# Patient Record
Sex: FEMALE | Race: WHITE | Employment: FULL TIME | URBAN - METROPOLITAN AREA
[De-identification: names, ages, dates, MRNs, and addresses within clinical notes are randomized per-mention and may not be internally consistent; named-entity substitution may affect disease eponyms.]

---

## 2021-01-06 ENCOUNTER — OFFICE VISIT (OUTPATIENT)
Dept: OBGYN CLINIC | Age: 26
End: 2021-01-06
Payer: COMMERCIAL

## 2021-01-06 VITALS
SYSTOLIC BLOOD PRESSURE: 130 MMHG | BODY MASS INDEX: 44.46 KG/M2 | WEIGHT: 251 LBS | HEART RATE: 97 BPM | DIASTOLIC BLOOD PRESSURE: 72 MMHG

## 2021-01-06 DIAGNOSIS — Z67.11 TYPE A BLOOD, RH NEGATIVE: ICD-10-CM

## 2021-01-06 DIAGNOSIS — O03.9 MISCARRIAGE: Primary | ICD-10-CM

## 2021-01-06 DIAGNOSIS — O03.9 MISCARRIAGE: ICD-10-CM

## 2021-01-06 LAB — GONADOTROPIN, CHORIONIC (HCG) QUANT: 6.1 MIU/ML

## 2021-01-06 PROCEDURE — 99204 OFFICE O/P NEW MOD 45 MIN: CPT | Performed by: ADVANCED PRACTICE MIDWIFE

## 2021-01-06 PROCEDURE — 96372 THER/PROPH/DIAG INJ SC/IM: CPT | Performed by: ADVANCED PRACTICE MIDWIFE

## 2021-01-06 ASSESSMENT — ENCOUNTER SYMPTOMS
COUGH: 0
NAUSEA: 0
CONSTIPATION: 0
ABDOMINAL PAIN: 0
SHORTNESS OF BREATH: 0
DIARRHEA: 0
VOMITING: 0

## 2021-01-06 NOTE — PROGRESS NOTES
Chief Complaint:     Kyle Siu is a 26 y.o. female who presents here today for complaints of:      Chief Complaint   Patient presents with    Follow-up      [facility] for SAB. HCG is 19. still bleeding steadily     History of Present Illness:     Miscarriage - here today with complaints of:  Presented to [facility] in [facility] with complaints of vaginal bleeding in early pregnancy.  Denies pain. Continues to have consistent moderate bleeding with passage of small clots/tissue.  Based on her HCG level and US results, she was diagnosed with miscarriage and discharged home.  Would like to begin trying to conceive again as soon as possible. Past Medical, Surgical, and Family History: Allergies:  Patient has no known allergies. Patient's last menstrual period was 2020 (exact date). Obstetrical History:     Contraceptive Method:  None, desires pregnancy    Past Medical History:   Diagnosis Date    Type A blood, Rh negative 2021     No past surgical history on file. Family History   Problem Relation Age of Onset    Breast Cancer Neg Hx      Medications:     Current Outpatient Medications on File Prior to Visit   Medication Sig Dispense Refill    Prenatal Vit-Fe Fumarate-FA (PRENATAL 1+1 PO) Take 1 tablet by mouth daily       No current facility-administered medications on file prior to visit. Review of Systems:     Review of Systems   Constitutional: Negative for chills, fatigue and fever. Respiratory: Negative for cough and shortness of breath. Gastrointestinal: Negative for abdominal pain, constipation, diarrhea, nausea and vomiting. Genitourinary: Negative for difficulty urinating, dysuria, menstrual problem, pelvic pain, vaginal bleeding and vaginal discharge. Neurological: Negative for dizziness and headaches. All other systems reviewed and are negative.     Physical Exam:     Vitals:  /72   Pulse 97   Wt 251 lb (113.9 kg)   LMP 2020 (Exact Date) Number of Occurrences:   1     Standing Expiration Date:   1/6/2022     No orders of the defined types were placed in this encounter.       VALENTINE Cho CNM

## 2021-01-07 NOTE — RESULT ENCOUNTER NOTE
HCG Trendin21 ( Lab) - 19  21 - 6.1    Repeat HCG in 1 week    I've sent a [employer] message, please make sure Patricia Ernandez has seen her lab results. She needs to have her HCG repeated in 1 week. The order is in. Thanks!

## 2021-01-12 DIAGNOSIS — O03.9 MISCARRIAGE: ICD-10-CM

## 2021-01-12 LAB — GONADOTROPIN, CHORIONIC (HCG) QUANT: 0.3 MIU/ML

## 2021-01-13 ENCOUNTER — TELEPHONE (OUTPATIENT)
Dept: OBGYN CLINIC | Age: 26
End: 2021-01-13

## 2021-07-22 ENCOUNTER — OFFICE VISIT (OUTPATIENT)
Dept: OBGYN CLINIC | Age: 26
End: 2021-07-22
Payer: COMMERCIAL

## 2021-07-22 VITALS
SYSTOLIC BLOOD PRESSURE: 122 MMHG | HEIGHT: 63 IN | BODY MASS INDEX: 47.66 KG/M2 | DIASTOLIC BLOOD PRESSURE: 74 MMHG | WEIGHT: 269 LBS | HEART RATE: 120 BPM

## 2021-07-22 DIAGNOSIS — Z32.01 POSITIVE URINE PREGNANCY TEST: ICD-10-CM

## 2021-07-22 DIAGNOSIS — Z76.89 ENCOUNTER TO ESTABLISH CARE WITH NEW DOCTOR: ICD-10-CM

## 2021-07-22 DIAGNOSIS — N91.2 AMENORRHEA: Primary | ICD-10-CM

## 2021-07-22 LAB
BILIRUBIN URINE: NEGATIVE
BLOOD, URINE: NEGATIVE
CLARITY: CLEAR
COLOR: YELLOW
GLUCOSE URINE: NEGATIVE MG/DL
HCG, URINE, POC: POSITIVE
KETONES, URINE: 40 MG/DL
LEUKOCYTE ESTERASE, URINE: NEGATIVE
Lab: ABNORMAL
NEGATIVE QC PASS/FAIL: ABNORMAL
NITRITE, URINE: NEGATIVE
PH UA: 5.5 (ref 5–9)
POSITIVE QC PASS/FAIL: ABNORMAL
PROTEIN UA: NEGATIVE MG/DL
SPECIFIC GRAVITY UA: 1.01 (ref 1–1.03)
UROBILINOGEN, URINE: 0.2 E.U./DL

## 2021-07-22 PROCEDURE — 1036F TOBACCO NON-USER: CPT | Performed by: ADVANCED PRACTICE MIDWIFE

## 2021-07-22 PROCEDURE — G8427 DOCREV CUR MEDS BY ELIG CLIN: HCPCS | Performed by: ADVANCED PRACTICE MIDWIFE

## 2021-07-22 PROCEDURE — G8419 CALC BMI OUT NRM PARAM NOF/U: HCPCS | Performed by: ADVANCED PRACTICE MIDWIFE

## 2021-07-22 PROCEDURE — 99214 OFFICE O/P EST MOD 30 MIN: CPT | Performed by: ADVANCED PRACTICE MIDWIFE

## 2021-07-22 PROCEDURE — 81025 URINE PREGNANCY TEST: CPT | Performed by: ADVANCED PRACTICE MIDWIFE

## 2021-07-22 NOTE — PROGRESS NOTES
Chief Complaint:     Kameron Ashley is a 26 y.o. female who presents here today for complaints of:      Chief Complaint   Patient presents with    Amenorrhea     LMP approximately 21       History of Present Illness:     Amenorrhea, Confirmation of Pregnancy      A home pregnancy test was taken, and the results was positive.  Patient's last menstrual period was 2021 (exact date). and was normal in character.  She reports the following associated symptoms:  fatigue, breast tenderness, nausea and positive home pregnancy test.     Pelvic pain/cramping:   Mild, occasional   Vaginal bleeding: no.     Taking PNV: yes. Nausea/Vomiting - She describes nausea/vomiting severity as moderate no emesis   Tolerating PO:  Somewhat     Alleviating/modifying factors tried:  Trigger avoidance, small meals. Gynecological History  · Prior Pap History:    · No prior pap records  · Sexual health:    · Exposure to a partner with a known sexually transmitted disease within the last 80 days - No  · Gender of sexual partners - Male  · Personal past history  of sexually transmitted diseases:  None    Past Medical, Surgical, and Family History: Allergies:  Patient has no known allergies. Patient's last menstrual period was 2021 (exact date). Obstetrical History:        Past Medical History:   Diagnosis Date    Type A blood, Rh negative 2021     History reviewed. No pertinent surgical history. Family History   Problem Relation Age of Onset    Breast Cancer Neg Hx      Medications:     Current Outpatient Medications on File Prior to Visit   Medication Sig Dispense Refill    Prenatal Vit-Fe Fumarate-FA (PRENATAL 1+1 PO) Take 1 tablet by mouth daily (Patient not taking: Reported on 2021)       No current facility-administered medications on file prior to visit. Review of Systems:     Review of Systems   Constitutional: Positive for fatigue.  Negative for activity change, urine pregnancy test  Type and screen    CBC Auto Differential    Glucose    HCG, Quantitative, Pregnancy    Hepatitis B Surface Antigen    Hepatitis C Antibody    HIV Screen    RPR Reflex to Titer and TPPA    Rubella antibody, IgG    TSH with Reflex    Urinalysis    Pain Management Drug Screen    Culture, Urine    US OB LESS THAN 14 WEEKS SINGLE OR FIRST GESTATION    US OB TRANSVAGINAL    PAP SMEAR    C.trachomatis N.gonorrhoeae DNA, Thin Prep    Wet prep, genital   3. Encounter to establish care with new doctor  Ambulatory referral to Family Practice     Plan:     1. Amenorrhea, Confirmation of Pregnancy  Dating US within the next 1-2 weeks  Obtain initial prenatal labs. Rx for prenatal vitamins    2. Nausea/Vomiting  Frequent nausea without emesis    3. Gynecological Exam  Pap - collected  Screening for STD's - collected with STD's    Follow Up:  Return in about 4 weeks (around 8/19/2021) for Initial OB Visit (Please schedule after dating US). Orders Placed This Encounter   Procedures    Culture, Urine     Standing Status:   Future     Number of Occurrences:   1     Standing Expiration Date:   7/22/2022     Order Specific Question:   Specify (ex-cath, midstream, cysto, etc)? Answer:   midstream    C.trachomatis N.gonorrhoeae DNA, Thin Prep     Standing Status:   Future     Number of Occurrences:   1     Standing Expiration Date:   7/22/2022    Wet prep, genital     Standing Status:   Future     Number of Occurrences:   1     Standing Expiration Date:   7/22/2022    US OB LESS THAN 14 WEEKS SINGLE OR FIRST GESTATION     Standing Status:   Future     Standing Expiration Date:   7/22/2022     Order Specific Question:   Reason for exam:     Answer:   Establish viability, confirm dating     Order Specific Question:   Reason for exam:     Answer:    Working [facility]:  3/8/22 based on an exact LMP (6/1/21)     Order Specific Question:   Reason for exam:     Answer:   Transvaginal PRN    US OB TRANSVAGINAL Standing Status:   Future     Standing Expiration Date:   7/22/2022     Order Specific Question:   Reason for exam:     Answer:   Transvaginal PRN    CBC Auto Differential     Standing Status:   Future     Number of Occurrences:   1     Standing Expiration Date:   7/22/2022    Glucose     Standing Status:   Future     Number of Occurrences:   1     Standing Expiration Date:   7/22/2022    HCG, Quantitative, Pregnancy     Standing Status:   Future     Number of Occurrences:   1     Standing Expiration Date:   7/22/2022    Hepatitis B Surface Antigen     Standing Status:   Future     Number of Occurrences:   1     Standing Expiration Date:   7/22/2022    Hepatitis C Antibody     Standing Status:   Future     Number of Occurrences:   1     Standing Expiration Date:   7/22/2022    HIV Screen     Standing Status:   Future     Number of Occurrences:   1     Standing Expiration Date:   7/22/2022    RPR Reflex to Titer and TPPA     Standing Status:   Future     Number of Occurrences:   1     Standing Expiration Date:   7/22/2022    Rubella antibody, IgG     Standing Status:   Future     Number of Occurrences:   1     Standing Expiration Date:   7/22/2022    TSH with Reflex     Standing Status:   Future     Number of Occurrences:   1     Standing Expiration Date:   7/22/2022    Urinalysis     Standing Status:   Future     Number of Occurrences:   1     Standing Expiration Date:   8/22/2021    Pain Management Drug Screen     Standing Status:   Future     Number of Occurrences:   1     Standing Expiration Date:   7/22/2022    PAP SMEAR     Standing Status:   Future     Number of Occurrences:   1     Standing Expiration Date:   7/22/2022     Order Specific Question:   Collection Type     Answer: Thin Prep     Order Specific Question:   Prior Abnormal Pap Test     Answer:   No     Order Specific Question:   Screening or Diagnostic     Answer:   Screening     Order Specific Question:   HPV Requested?      Answer: Yes     Order Specific Question:   High Risk Patient     Answer:   N/A    Ambulatory referral to Family Practice     Referral Priority:   Routine     Referral Type:   Consult for Advice and Opinion     Number of Visits Requested:   1    POC Pregnancy Ur-Qual    Type and screen     Standing Status:   Future     Number of Occurrences:   1     Standing Expiration Date:   7/22/2022     No orders of the defined types were placed in this encounter.

## 2021-07-23 DIAGNOSIS — Z32.01 POSITIVE URINE PREGNANCY TEST: ICD-10-CM

## 2021-07-23 LAB
ABO/RH: NORMAL
ANTIBODY SCREEN: NORMAL
BASOPHILS ABSOLUTE: 0 K/UL (ref 0–0.2)
BASOPHILS RELATIVE PERCENT: 0.4 %
CLUE CELLS: NORMAL
EOSINOPHILS ABSOLUTE: 0.1 K/UL (ref 0–0.7)
EOSINOPHILS RELATIVE PERCENT: 1.1 %
GLUCOSE BLD-MCNC: 93 MG/DL (ref 70–99)
GONADOTROPIN, CHORIONIC (HCG) QUANT: 4001 MIU/ML
HCT VFR BLD CALC: 39.2 % (ref 37–47)
HEMOGLOBIN: 13.3 G/DL (ref 12–16)
HEPATITIS B SURFACE ANTIGEN INTERPRETATION: NORMAL
HEPATITIS C ANTIBODY INTERPRETATION: NORMAL
LYMPHOCYTES ABSOLUTE: 2.3 K/UL (ref 1–4.8)
LYMPHOCYTES RELATIVE PERCENT: 24.3 %
MCH RBC QN AUTO: 29.7 PG (ref 27–31.3)
MCHC RBC AUTO-ENTMCNC: 34 % (ref 33–37)
MCV RBC AUTO: 87.1 FL (ref 82–100)
MONOCYTES ABSOLUTE: 0.5 K/UL (ref 0.2–0.8)
MONOCYTES RELATIVE PERCENT: 5.1 %
NEUTROPHILS ABSOLUTE: 6.6 K/UL (ref 1.4–6.5)
NEUTROPHILS RELATIVE PERCENT: 69.1 %
PDW BLD-RTO: 13.7 % (ref 11.5–14.5)
PLATELET # BLD: 281 K/UL (ref 130–400)
RBC # BLD: 4.49 M/UL (ref 4.2–5.4)
RUBELLA ANTIBODY IGG: 320.3 IU/ML
TRICHOMONAS PREP: NORMAL
TRICHOMONAS VAGINALIS SCREEN: NEGATIVE
TSH REFLEX: 2.48 UIU/ML (ref 0.44–3.86)
WBC # BLD: 9.6 K/UL (ref 4.8–10.8)
YEAST WET PREP: NORMAL

## 2021-07-24 PROBLEM — Z78.9 RUBELLA IMMUNE: Status: ACTIVE | Noted: 2021-07-24

## 2021-07-24 LAB
RPR: NORMAL
URINE CULTURE, ROUTINE: NORMAL

## 2021-07-25 LAB
6-ACETYLMORPHINE: NOT DETECTED
7-AMINOCLONAZEPAM: NOT DETECTED
ALPHA-OH-ALPRAZOLAM: NOT DETECTED
ALPHA-OH-MIDAZOLAM, URINE: NOT DETECTED
ALPRAZOLAM: NOT DETECTED
AMPHETAMINE: NOT DETECTED
BARBITURATES: NOT DETECTED
BENZOYLECGONINE: NOT DETECTED
BUPRENORPHINE: NOT DETECTED
CARISOPRODOL: NOT DETECTED
CLONAZEPAM: NOT DETECTED
CODEINE: NOT DETECTED
CREATININE URINE: 83.8 MG/DL (ref 20–400)
DIAZEPAM: NOT DETECTED
EER PAIN MGT DRUG PANEL, HIGH RES/EMIT U: NORMAL
ETHYL GLUCURONIDE: NOT DETECTED
FENTANYL: NOT DETECTED
GABAPENTIN: NOT DETECTED
HYDROCODONE: NOT DETECTED
HYDROMORPHONE: NOT DETECTED
LORAZEPAM: NOT DETECTED
MARIJUANA METABOLITE: NOT DETECTED
MDA: NOT DETECTED
MDEA: NOT DETECTED
MDMA URINE: NOT DETECTED
MEPERIDINE: NOT DETECTED
METHADONE: NOT DETECTED
METHAMPHETAMINE: NOT DETECTED
METHYLPHENIDATE: NOT DETECTED
MIDAZOLAM: NOT DETECTED
MORPHINE: NOT DETECTED
NALOXONE: NOT DETECTED
NORBUPRENORPHINE, FREE: NOT DETECTED
NORDIAZEPAM: NOT DETECTED
NORFENTANYL: NOT DETECTED
NORHYDROCODONE, URINE: NOT DETECTED
NOROXYCODONE: NOT DETECTED
NOROXYMORPHONE, URINE: NOT DETECTED
OXAZEPAM: NOT DETECTED
OXYCODONE: NOT DETECTED
OXYMORPHONE: NOT DETECTED
PAIN MANAGEMENT DRUG PANEL: NORMAL
PCP: NOT DETECTED
PHENTERMINE: NOT DETECTED
PREGABALIN: NOT DETECTED
TAPENTADOL, URINE: NOT DETECTED
TAPENTADOL-O-SULFATE, URINE: NOT DETECTED
TEMAZEPAM: NOT DETECTED
TRAMADOL: NOT DETECTED
ZOLPIDEM: NOT DETECTED

## 2021-07-27 LAB
C. TRACHOMATIS DNA,THIN PREP: NEGATIVE
HIV AG/AB: NONREACTIVE
HPV COMMENT: NORMAL
HPV TYPE 16: NOT DETECTED
HPV TYPE 18: NOT DETECTED
HPVOH (OTHER TYPES): NOT DETECTED
N. GONORRHOEAE DNA, THIN PREP: NEGATIVE

## 2021-07-28 ASSESSMENT — ENCOUNTER SYMPTOMS
TROUBLE SWALLOWING: 0
RHINORRHEA: 0
COUGH: 0
VOICE CHANGE: 0
SHORTNESS OF BREATH: 0
CONSTIPATION: 0
SORE THROAT: 0
DIARRHEA: 0
NAUSEA: 1
VOMITING: 0
ABDOMINAL PAIN: 0

## 2021-08-02 ENCOUNTER — HOSPITAL ENCOUNTER (OUTPATIENT)
Dept: ULTRASOUND IMAGING | Age: 26
Discharge: HOME | End: 2021-08-04
Payer: COMMERCIAL

## 2021-08-02 DIAGNOSIS — N91.2 AMENORRHEA: ICD-10-CM

## 2021-08-02 DIAGNOSIS — Z32.01 POSITIVE URINE PREGNANCY TEST: ICD-10-CM

## 2021-08-02 PROCEDURE — 76817 TRANSVAGINAL US OBSTETRIC: CPT

## 2021-08-02 PROCEDURE — 76801 OB US < 14 WKS SINGLE FETUS: CPT

## 2021-08-04 ENCOUNTER — TELEPHONE (OUTPATIENT)
Dept: OBGYN CLINIC | Age: 26
End: 2021-08-04

## 2021-08-04 DIAGNOSIS — Z34.90 EARLY STAGE OF PREGNANCY: Primary | ICD-10-CM

## 2021-08-05 DIAGNOSIS — Z34.90 EARLY STAGE OF PREGNANCY: ICD-10-CM

## 2021-08-05 LAB — GONADOTROPIN, CHORIONIC (HCG) QUANT: NORMAL MIU/ML

## 2021-08-06 DIAGNOSIS — Z34.90 EARLY STAGE OF PREGNANCY: Primary | ICD-10-CM

## 2021-08-18 ENCOUNTER — HOSPITAL ENCOUNTER (OUTPATIENT)
Dept: ULTRASOUND IMAGING | Age: 26
Discharge: HOME | End: 2021-08-20
Payer: COMMERCIAL

## 2021-08-18 DIAGNOSIS — Z34.90 EARLY STAGE OF PREGNANCY: ICD-10-CM

## 2021-08-18 PROCEDURE — 76817 TRANSVAGINAL US OBSTETRIC: CPT

## 2021-08-18 PROCEDURE — 76801 OB US < 14 WKS SINGLE FETUS: CPT

## 2021-08-19 ENCOUNTER — OFFICE VISIT (OUTPATIENT)
Dept: OBGYN CLINIC | Age: 26
End: 2021-08-19
Payer: COMMERCIAL

## 2021-08-19 VITALS
WEIGHT: 268 LBS | HEART RATE: 100 BPM | BODY MASS INDEX: 47.48 KG/M2 | DIASTOLIC BLOOD PRESSURE: 62 MMHG | HEIGHT: 63 IN | SYSTOLIC BLOOD PRESSURE: 118 MMHG

## 2021-08-19 DIAGNOSIS — Z67.11 TYPE A BLOOD, RH NEGATIVE: Primary | ICD-10-CM

## 2021-08-19 DIAGNOSIS — O20.9 BLEEDING IN EARLY PREGNANCY: ICD-10-CM

## 2021-08-19 DIAGNOSIS — O20.0 THREATENED MISCARRIAGE IN EARLY PREGNANCY: ICD-10-CM

## 2021-08-19 PROCEDURE — G8427 DOCREV CUR MEDS BY ELIG CLIN: HCPCS | Performed by: ADVANCED PRACTICE MIDWIFE

## 2021-08-19 PROCEDURE — G8419 CALC BMI OUT NRM PARAM NOF/U: HCPCS | Performed by: ADVANCED PRACTICE MIDWIFE

## 2021-08-19 PROCEDURE — 1036F TOBACCO NON-USER: CPT | Performed by: ADVANCED PRACTICE MIDWIFE

## 2021-08-19 PROCEDURE — 99214 OFFICE O/P EST MOD 30 MIN: CPT | Performed by: ADVANCED PRACTICE MIDWIFE

## 2021-08-19 PROCEDURE — 96372 THER/PROPH/DIAG INJ SC/IM: CPT | Performed by: ADVANCED PRACTICE MIDWIFE

## 2021-08-20 ENCOUNTER — TELEPHONE (OUTPATIENT)
Dept: OBGYN CLINIC | Age: 26
End: 2021-08-20

## 2021-08-20 DIAGNOSIS — N96 RECURRENT PREGNANCY LOSS: Primary | ICD-10-CM

## 2021-08-20 DIAGNOSIS — O03.9 SPONTANEOUS MISCARRIAGE: Primary | ICD-10-CM

## 2021-08-20 NOTE — TELEPHONE ENCOUNTER
SUBJECTIVE:  Called to discuss HCG results, she had already viewed her results through [address] Discussed weekly HCG until negative. Endometrium will be provided for her to begin using when conception occurs again. OBJECTIVE:  HCG - 7,097 (previously 17,048)  Blood Type:  A Negative. Rhogam given 8/19/21    ASSESSMENT:  Spontaneous miscarriage  Rh Negative - Rhogam given 8/19/21    PLAN:  Trend HCG weekly until negative  Rx for Endometrium - begin with positive pregnancy test.  Work Excuse Letter - days missed in January and this month (August 19, 20, 21, 22.)  She may return on 8/23/21 without restrictions. Email letter to [email or web address]    VALENTINE Turpin CNM

## 2021-08-21 ASSESSMENT — ENCOUNTER SYMPTOMS
SHORTNESS OF BREATH: 0
DIARRHEA: 0
NAUSEA: 0
VOMITING: 0
CONSTIPATION: 0
ABDOMINAL PAIN: 0

## 2021-08-21 NOTE — PROGRESS NOTES
SUBJECTIVE:  Deana Dobson is a 26 y.o. female who presents here today for complaints of:      Chief Complaint   Patient presents with    Follow-up     Hcg quant 8/5/21. US done 8/18/21     Threatened Miscarriage - here today with complaints of:  Experiencing bleeding that has become heavier with cramping.  Discussed US results - no FHR seen   Discussed the possibility of miscarriage verses continued pregnancy    Review of Systems   Eyes: Negative for visual disturbance. Respiratory: Negative for shortness of breath. Gastrointestinal: Negative for abdominal pain, constipation, diarrhea, nausea and vomiting. Genitourinary: Positive for vaginal bleeding. Negative for dysuria and vaginal discharge. Neurological: Negative for headaches. OBJECTIVE:  Vitals:  /62 (Site: Left Upper Arm, Position: Sitting, Cuff Size: Large Adult)   Pulse 100   Ht 5' 3\" (1.6 m)   Wt 268 lb (121.6 kg)   LMP 06/01/2021 (Exact Date)   BMI 47.47 kg/m²     8/18/21 Dating Ultrasound:  6w 1d, [facility] 4/12/22. Single gestation, IUP. FHR absent      8/2/21 Dating Ultrasound:  5w 3d, [facility] 4/1/22.  Gestational sac with fetal pole, no FHR.       Physical Exam  Appearance:  Normal appearance  Cardiovascular:  Normal rate, Capillary refill less than 2 seconds  Pulmonary:  Normal effort, no distress  Abdominal:  No tenderness  MS:  No Swelling, No dependent edema  Skin:  Warm, dry  Neuro:  Alert and oriented x3, reflexes normal.  Psychiatric:  Normal mood and behavior    Physical Exam  ASSESSMENT & PLAN:   Diagnosis Orders   1. Type A blood, Rh negative  Type and screen    rho(D) immune globulin (HYPERRHO S/D) injection 300 mcg   2. Threatened miscarriage in early pregnancy  HCG, Quantitative, Pregnancy   3. Bleeding in early pregnancy  Type and screen    rho(D) immune globulin (HYPERRHO S/D) injection 300 mcg       1. Threatened [address] with 6 week embryo, no FHR  Vaginal bleeding  Trend HCG    2.  Rh Negative  Rhogam given    [facility] will call to schedule follow-up.     VALENTINE Christina CNM

## 2021-08-25 ENCOUNTER — PATIENT MESSAGE (OUTPATIENT)
Dept: OBGYN CLINIC | Age: 26
End: 2021-08-25

## 2021-08-25 DIAGNOSIS — N96 RECURRENT PREGNANCY LOSS: ICD-10-CM

## 2021-08-26 NOTE — TELEPHONE ENCOUNTER
From: Elvira Garza  To: VALENTINE Sim CNM  Sent: 8/25/2021 5:10 PM EDT  Subject: Prescription Question    Hi again,    I'm sorry to keep reach out. Apparently the progesterone is a specialty prescription that has to go to a specialty pharmacy. Can you fax this prescription to [facility] through [address] at [phone] please?  I'm getting the run around    Thank you,  Jesus
Order pended for print
hard copy, drawn during this pregnancy

## 2021-08-27 DIAGNOSIS — N96 RECURRENT PREGNANCY LOSS: ICD-10-CM

## 2021-09-04 ENCOUNTER — OFFICE VISIT (OUTPATIENT)
Dept: OBGYN CLINIC | Age: 26
End: 2021-09-04
Payer: COMMERCIAL

## 2021-09-04 VITALS
WEIGHT: 265 LBS | BODY MASS INDEX: 46.95 KG/M2 | SYSTOLIC BLOOD PRESSURE: 128 MMHG | DIASTOLIC BLOOD PRESSURE: 78 MMHG | HEIGHT: 63 IN

## 2021-09-04 DIAGNOSIS — N76.0 BACTERIAL VAGINITIS: ICD-10-CM

## 2021-09-04 DIAGNOSIS — B96.89 BACTERIAL VAGINITIS: ICD-10-CM

## 2021-09-04 DIAGNOSIS — B37.31 CANDIDAL VAGINITIS: Primary | ICD-10-CM

## 2021-09-04 DIAGNOSIS — N89.8 VAGINAL DISCHARGE: ICD-10-CM

## 2021-09-04 PROCEDURE — 1036F TOBACCO NON-USER: CPT | Performed by: ADVANCED PRACTICE MIDWIFE

## 2021-09-04 PROCEDURE — G8427 DOCREV CUR MEDS BY ELIG CLIN: HCPCS | Performed by: ADVANCED PRACTICE MIDWIFE

## 2021-09-04 PROCEDURE — 99213 OFFICE O/P EST LOW 20 MIN: CPT | Performed by: ADVANCED PRACTICE MIDWIFE

## 2021-09-04 PROCEDURE — G8419 CALC BMI OUT NRM PARAM NOF/U: HCPCS | Performed by: ADVANCED PRACTICE MIDWIFE

## 2021-09-04 RX ORDER — FLUCONAZOLE 150 MG/1
TABLET ORAL
Qty: 3 TABLET | Refills: 1 | Status: SHIPPED | OUTPATIENT
Start: 2021-09-04

## 2021-09-04 RX ORDER — METRONIDAZOLE 500 MG/1
500 TABLET ORAL 2 TIMES DAILY
Qty: 14 TABLET | Refills: 1 | Status: SHIPPED | OUTPATIENT
Start: 2021-09-04 | End: 2021-09-11

## 2021-09-04 ASSESSMENT — ENCOUNTER SYMPTOMS
NAUSEA: 0
DIARRHEA: 0
ABDOMINAL PAIN: 0
SHORTNESS OF BREATH: 0
CONSTIPATION: 0
VOMITING: 0
COUGH: 0

## 2021-09-04 ASSESSMENT — PATIENT HEALTH QUESTIONNAIRE - PHQ9
SUM OF ALL RESPONSES TO PHQ9 QUESTIONS 1 & 2: 2
1. LITTLE INTEREST OR PLEASURE IN DOING THINGS: 1
SUM OF ALL RESPONSES TO PHQ QUESTIONS 1-9: 2
2. FEELING DOWN, DEPRESSED OR HOPELESS: 1

## 2021-09-04 NOTE — PROGRESS NOTES
SUBJECTIVE:  Zuleima Ingram is a 26 y.o. female who presents here today for complaints of:      Chief Complaint   Patient presents with    Vaginal Bleeding     vaginal bleeding for 2 weeks. vaginal odor after miscarring      Vaginitis - here today with complaints of: Increased vaginal odor.  Associated symptoms:   o Denies fever, headache, malaise, lymphadenopathy, myalgias. o Denies dysuria, frequency, urgency. Review of Systems   Respiratory: Negative for cough and shortness of breath. Gastrointestinal: Negative for abdominal pain, constipation, diarrhea, nausea and vomiting. Genitourinary: Negative for difficulty urinating, dysuria, menstrual problem, pelvic pain, vaginal bleeding and vaginal discharge. All other systems reviewed and are negative. OBJECTIVE:  Vitals:  /78   Ht 5' 3\" (1.6 m)   Wt 265 lb (120.2 kg)   LMP 06/01/2021 (Exact Date)   Breastfeeding No   BMI 46.94 kg/m²     Physical Exam  Appearance:  Normal appearance  Cardiovascular:  Normal rate, Capillary refill less than 2 seconds  Pulmonary:  Normal effort, no distress  Abdominal:  No tenderness  MS:  No Swelling, No dependent edema  Skin:  Warm, dry  Neuro:  Alert and oriented x3, reflexes normal.  Psychiatric:  Normal mood and behavior    ASSESSMENT & PLAN:   Diagnosis Orders   1. Candidal vaginitis  fluconazole (DIFLUCAN) 150 MG tablet    Wet prep, genital   2. Bacterial vaginitis  metroNIDAZOLE (FLAGYL) 500 MG tablet    Wet prep, genital   3. Vaginal discharge  Wet prep, genital       1. Increased Vaginal Odor, Irritation  Vaginitis, Bacterial - Rx for Flagyl  Vaginitis, Candidal - Rx for Diflucan    Return if symptoms worsen or fail to improve.     VALENTINE Rosales - STEVE

## 2021-09-16 DIAGNOSIS — O03.4 RETAINED PRODUCTS OF CONCEPTION AFTER MISCARRIAGE: Primary | ICD-10-CM

## 2021-11-03 ENCOUNTER — E-VISIT (OUTPATIENT)
Dept: PRIMARY CARE CLINIC | Age: 26
End: 2021-11-03
Payer: COMMERCIAL

## 2021-11-03 DIAGNOSIS — B34.9 VIRAL SYNDROME: Primary | ICD-10-CM

## 2021-11-03 PROCEDURE — 99421 OL DIG E/M SVC 5-10 MIN: CPT | Performed by: INTERNAL MEDICINE

## 2021-11-03 ASSESSMENT — LIFESTYLE VARIABLES
PACKS_PER_DAY: .2
SMOKING_STATUS: NO, BUT I USED TO SMOKE
SMOKING_YEARS: 1

## 2021-11-03 NOTE — PROGRESS NOTES
If your symptoms are improving you probably have a viral upper respiratory and chest infection. Please continue your current therapy and follow up with your PCP as needed. 5-10 minutes were spent on this E visit.

## 2022-12-30 ENCOUNTER — E-VISIT (OUTPATIENT)
Dept: PRIMARY CARE CLINIC | Age: 27
End: 2022-12-30
Payer: COMMERCIAL

## 2022-12-30 DIAGNOSIS — U07.1 COVID-19: Primary | ICD-10-CM

## 2022-12-30 PROCEDURE — 99422 OL DIG E/M SVC 11-20 MIN: CPT | Performed by: NURSE PRACTITIONER

## 2022-12-30 ASSESSMENT — LIFESTYLE VARIABLES
SMOKING_YEARS: 1
PACKS_PER_DAY: 1
SMOKING_STATUS: NO, BUT I USED TO SMOKE

## 2022-12-30 NOTE — PROGRESS NOTES
Ariana Olsen ([id number]) initiated an asynchronous digital communication through [employer]. HPI: per patient questionnaire     Exam: not applicable    Diagnoses and all orders for this visit:  Diagnoses and all orders for this visit:    [id number]  Recommend supportive care measures  Follow-up with PCP as needed  Time: EV2 - 11-20 minutes were spent on the digital evaluation and management of this patient.  16 min     VALENTINE Kennedy - CNP